# Patient Record
Sex: MALE | Race: WHITE | NOT HISPANIC OR LATINO | URBAN - METROPOLITAN AREA
[De-identification: names, ages, dates, MRNs, and addresses within clinical notes are randomized per-mention and may not be internally consistent; named-entity substitution may affect disease eponyms.]

---

## 2018-02-21 ENCOUNTER — OFFICE VISIT CONVERTED (OUTPATIENT)
Dept: UROLOGY | Facility: CLINIC | Age: 78
End: 2018-02-21
Attending: UROLOGY

## 2018-08-21 ENCOUNTER — OFFICE VISIT CONVERTED (OUTPATIENT)
Dept: SURGERY | Facility: CLINIC | Age: 78
End: 2018-08-21
Attending: PHYSICIAN ASSISTANT

## 2018-08-21 ENCOUNTER — CONVERSION ENCOUNTER (OUTPATIENT)
Dept: SURGERY | Facility: CLINIC | Age: 78
End: 2018-08-21

## 2018-11-02 ENCOUNTER — OFFICE VISIT CONVERTED (OUTPATIENT)
Dept: UROLOGY | Facility: CLINIC | Age: 78
End: 2018-11-02
Attending: UROLOGY

## 2019-02-04 ENCOUNTER — HOSPITAL ENCOUNTER (OUTPATIENT)
Dept: LAB | Facility: HOSPITAL | Age: 79
Discharge: HOME | End: 2019-02-04
Attending: UROLOGY

## 2019-02-04 LAB — PSA SERPL-MCNC: 0.82 NG/ML (ref 0–4)

## 2019-02-06 ENCOUNTER — OFFICE VISIT CONVERTED (OUTPATIENT)
Dept: UROLOGY | Facility: CLINIC | Age: 79
End: 2019-02-06
Attending: UROLOGY

## 2019-03-06 ENCOUNTER — HOSPITAL ENCOUNTER (OUTPATIENT)
Dept: ULTRASOUND IMAGING | Facility: HOSPITAL | Age: 79
Discharge: HOME | End: 2019-03-06
Attending: INTERNAL MEDICINE

## 2019-03-06 LAB
BASOPHILS # BLD AUTO: 0.12 10*3/UL (ref 0–0.2)
BASOPHILS NFR BLD AUTO: 0.9 % (ref 0–3)
CONV ABS IMM GRAN: 0.12 10*3/UL (ref 0–0.2)
CONV IMMATURE GRAN: 0.9 % (ref 0–1.8)
DEPRECATED RDW RBC AUTO: 46.1 FL (ref 35.1–43.9)
EOSINOPHIL # BLD AUTO: 1.14 10*3/UL (ref 0–0.7)
EOSINOPHIL # BLD AUTO: 8.7 % (ref 0–7)
ERYTHROCYTE [DISTWIDTH] IN BLOOD BY AUTOMATED COUNT: 13.4 % (ref 11.6–14.4)
HBA1C MFR BLD: 12.9 G/DL (ref 14–18)
HCT VFR BLD AUTO: 40 % (ref 42–52)
INR PPP: 0.88 (ref 2–3)
LYMPHOCYTES # BLD AUTO: 1.4 10*3/UL (ref 1–5)
MCH RBC QN AUTO: 30.2 PG (ref 27–31)
MCHC RBC AUTO-ENTMCNC: 32.3 G/DL (ref 33–37)
MCV RBC AUTO: 93.7 FL (ref 80–96)
MONOCYTES # BLD AUTO: 0.95 10*3/UL (ref 0.2–1.2)
MONOCYTES NFR BLD AUTO: 7.2 % (ref 3–10)
NEUTROPHILS # BLD AUTO: 9.38 10*3/UL (ref 2–8)
NEUTROPHILS NFR BLD AUTO: 71.6 % (ref 30–85)
NRBC CBCN: 0 % (ref 0–0.7)
PLATELET # BLD AUTO: 196 10*3/UL (ref 130–400)
PMV BLD AUTO: 8.8 FL (ref 9.4–12.4)
PROTHROMBIN TIME: 9.4 S (ref 9.4–12)
RBC # BLD AUTO: 4.27 10*6/UL (ref 4.7–6.1)
VARIANT LYMPHS NFR BLD MANUAL: 10.7 % (ref 20–45)
WBC # BLD AUTO: 13.11 10*3/UL (ref 4.8–10.8)

## 2019-03-12 ENCOUNTER — HOSPITAL ENCOUNTER (OUTPATIENT)
Dept: ONCOLOGY | Facility: HOSPITAL | Age: 79
Discharge: HOME | End: 2019-03-12
Attending: INTERNAL MEDICINE

## 2019-03-12 ENCOUNTER — OFFICE VISIT CONVERTED (OUTPATIENT)
Dept: ONCOLOGY | Facility: HOSPITAL | Age: 79
End: 2019-03-12
Attending: INTERNAL MEDICINE

## 2019-03-19 ENCOUNTER — HOSPITAL ENCOUNTER (OUTPATIENT)
Dept: PET IMAGING | Facility: HOSPITAL | Age: 79
Discharge: HOME | End: 2019-03-19
Attending: INTERNAL MEDICINE

## 2019-03-21 ENCOUNTER — HOSPITAL ENCOUNTER (OUTPATIENT)
Dept: GENERAL RADIOLOGY | Facility: HOSPITAL | Age: 79
Discharge: HOME | End: 2019-03-21
Attending: INTERNAL MEDICINE

## 2019-03-21 LAB
CREAT BLD-MCNC: 2.1 MG/DL (ref 0.6–1.4)
GFR SERPLBLD BASED ON 1.73 SQ M-ARVRAT: 29 ML/MIN/{1.73_M2}

## 2019-03-26 ENCOUNTER — HOSPITAL ENCOUNTER (OUTPATIENT)
Dept: RADIATION ONCOLOGY | Facility: HOSPITAL | Age: 79
Setting detail: RECURRING SERIES
Discharge: HOME | End: 2019-03-31
Attending: RADIOLOGY

## 2019-04-02 ENCOUNTER — HOSPITAL ENCOUNTER (OUTPATIENT)
Dept: ONCOLOGY | Facility: HOSPITAL | Age: 79
Discharge: HOME | End: 2019-04-02
Attending: INTERNAL MEDICINE

## 2019-04-02 ENCOUNTER — OFFICE VISIT CONVERTED (OUTPATIENT)
Dept: ONCOLOGY | Facility: HOSPITAL | Age: 79
End: 2019-04-02
Attending: INTERNAL MEDICINE

## 2021-05-16 VITALS — BODY MASS INDEX: 22 KG/M2 | WEIGHT: 157.12 LBS | RESPIRATION RATE: 14 BRPM | HEIGHT: 71 IN

## 2021-05-16 VITALS — RESPIRATION RATE: 18 BRPM | BODY MASS INDEX: 20.86 KG/M2 | HEIGHT: 71 IN | WEIGHT: 149 LBS

## 2021-05-16 VITALS
DIASTOLIC BLOOD PRESSURE: 80 MMHG | WEIGHT: 170 LBS | HEIGHT: 71 IN | SYSTOLIC BLOOD PRESSURE: 132 MMHG | BODY MASS INDEX: 23.8 KG/M2

## 2021-05-16 VITALS — BODY MASS INDEX: 21.98 KG/M2 | RESPIRATION RATE: 14 BRPM | HEIGHT: 71 IN | WEIGHT: 157 LBS

## 2021-05-28 VITALS
HEIGHT: 70 IN | SYSTOLIC BLOOD PRESSURE: 111 MMHG | BODY MASS INDEX: 19.35 KG/M2 | HEIGHT: 70 IN | BODY MASS INDEX: 20.1 KG/M2 | OXYGEN SATURATION: 99 % | HEART RATE: 111 BPM | DIASTOLIC BLOOD PRESSURE: 88 MMHG | SYSTOLIC BLOOD PRESSURE: 156 MMHG | TEMPERATURE: 98.2 F | WEIGHT: 140.37 LBS | RESPIRATION RATE: 18 BRPM | OXYGEN SATURATION: 97 % | HEART RATE: 103 BPM | WEIGHT: 135.14 LBS | TEMPERATURE: 98.5 F | DIASTOLIC BLOOD PRESSURE: 94 MMHG | RESPIRATION RATE: 16 BRPM

## 2021-05-28 NOTE — PROGRESS NOTES
Patient: NITHIN VIZCAINO     Acct: [id number]     Report: #[id number]  UNIT #: [id number]     : 1940    Encounter Date:2019  PRIMARY CARE: Angel Stone  ***Signed***  --------------------------------------------------------------------------------------------------------------------  NURSE INTAKE      Visit Type      Established Patient Visit            Chief Complaint      LUNG CANCER            Referring Provider/Copies To      Referring Provider:  Que Saini      Primary Care Provider:  ISI STONE      Copies To:   Que Saini ;            History and Present Illness      Past Oncology Illness History      1) NSCLC: favor ADENOCARCINOMA; diagnosed via core biopsy of RIGHT chest wall     lesion 3/6/19; PET and MRI staging c/w Stage IV with extra-thoracic extension;     CARIS: (+) PD-L1: 90%, and MET (+) (3/13/19)            Treatment History:            1) Plan for PET scan/MRI of brain and referral to XRT for consideration for     palliative XRT (3/12/19)      2) PET scan: 1. Hypermetabolic destructive chest wall mass involving the lateral    right 4th and 5th ribs.  Hypermetabolic mediastinal and right hilar adenopathy.     Right pleural metastatic disease. 2. A few bilateral small spiculated pulmonary     nodule suspicious for metastatic disease. (3/19/19)      3) Patient preference for Cabozantinib (19)            HPI - Oncology Interim      Mr. Vizcaino comes in for f/u regarding several chronic issues:            1) Metastatic NSCLC: Mr. Vizcaino comes in for follow-up.  In the interim, he had a    PET scan performed as well as an MRI of the brain and evaluation with radiation     oncology.  He comes in to review the results.  Overall, he reports that he is     worsening.  Specifically, he complains of nausea.  He also reports worsening     pain.  He denies fevers or chills or sweats.  He does report a cough.  The cough    is productive for clear sputum.  It is  intermittent and of mild severity.  No     other modifying factors or associated signs or symptoms.            2) Cancer Pain: Mr. Vizcaino does complain of pain.  The pain is located in the     right chest wall.  It is an achy pain of moderate severity.  It is improved with    tramadol treatment, but he reports that he is needing more treatment to     adequately control his pain.  No other modifying factors or associated signs or     symptoms.  He does report that he was seen by radiation oncology for a     discussion regarding palliative measures.            3) Constipation: Mr. Vizcaino does complain of constipation.  He reports that he is    tried stool softeners and laxatives with some minimal improvement in his     symptoms.  He does complain of associated nausea at this time.            Clinical Trial Participant      No            ECOG Performance Status      1            Most Recent Imaging Findings      Patient: NITHIN VIZCAINO   Acct: #[id number]   Report: #[id number]            UNIT #: [id number]    DOS: 19       INSURANCE:[employer]   ORDER #:[id number]      LOCATION:PET     : 1940            PROVIDERS      ADMITTING:     ATTENDING: Que Saini      FAMILY:  [facility]   ORDERING:  Que Saini         OTHER:    DICTATING:  Rodríguez Robles MD, IV            REQ #:[id number]   EXAM:ISKBSMIDTH - SKULL BASE-MIDTHIGH INIT fdg      REASON FOR EXAM:  LUNG CA      REASON FOR VISIT:  LUNG CA            *******Signed******         PROCEDURE:   PET CT SKULL BASE TO MID THIGH INITIAL             COMPARISON:   [facility], , CHEST PA/AP   12:22.  [facility], , CHEST PA/AP   [facility],       CT, CHEST W/ CONTRAST, 2016, 14:02.  [facility], US, CORE     BIOPSY SOFT TISSUE       MASS, 3/06/2019, 8:32.  [facility], CT, CHEST W/ CONTRAST,     2019, 10:18.             INDICATIONS:   LUNG CA              TECHNIQUE:   After obtaining the patient's consent, F-18 FDG was administered     intravenously.  A PET       scan with concurrent CT imaging was performed from the skull to the thigh with     multiplanar imaging.             RADIONUCLIDE:     12.22 MCI   F18 FDG- I.V.      LABS:                          Blood Glucose 198 mg/dl             FINDINGS:         HEAD/NECK:   Hypermetabolic activity is present in the right lateral pterygoid     muscle probably       physiologic.  1.2 cm hypermetabolic right supraclavicular node.          THORAX:   There is a hypermetabolic destructive chest wall mass involving the     lateral right 4th and       5th ribs.  There is a 2 cm hypermetabolic right axillary node.  Right pleural     metastatic disease is       present.  There is a 1.2 cm hypermetabolic left upper lobe nodule.  There is a 7    mm spiculated       right upper lobe nodule with suspicious morphology.  Hypermetabolic mediastinal     and right hilar       adenopathy is present.  The largest node is in the right hilar region measuring     3.6 cm.  No       evidence of significant pleural effusion.  Calcified right pleural plaques are     present.  7 mm       spiculated nodule along the left major fissure with faint hypermetabolic     activity.        ABDOMEN:   Gallstones are present in the gallbladder.  No evidence of abnormal     hypermetabolic       activity in the solid abdominal organs.  No pathologic FDG activity.        PELVIS:   Normal.  No pathologic FDG activity.        BONES:   No hypermetabolic osseous lesions are identified.  No pathologic FDG     activity.        OTHER:   Negative.               CONCLUSION:                1. Hypermetabolic destructive chest wall mass involving the lateral right 4th     and 5th ribs.        Hypermetabolic mediastinal and right hilar adenopathy.  Right pleural metastatic    disease.             2. A few bilateral small spiculated pulmonary nodule suspicious for  metastatic     disease.            Patient: NITHIN ZHONG   Acct: #[id number]   Report: #[id number]            UNIT #: [id number]    DOS: 19 1200      INSURANCE:[employer] PPO   ORDER #:MRI [id number]      LOCATION:DAISY     : 1940            PROVIDERS      ADMITTING:     ATTENDING: Que Saini      FAMILY:  NONE,MD   ORDERING:  Que Saini         OTHER:  TOBIN RAMIREZ   DICTATING:  Rodríguez Robles MD, IV            REQ #:[id number]   EXAM:BRAWO - MRI BRAIN  wo CONTRAST      REASON FOR EXAM:        REASON FOR VISIT:  C34.90/LUNG CA            *******Signed******         PROCEDURE:   MRI BRAIN WITHOUT CONTRAST             COMPARISON:   [facility], PET, SKULL BASE TO MID THIGH INITIAL,     3/19/2019, 13:45.             INDICATIONS:   RECENT DIAGNOSIS OF LUNG CANCER 3/5/19, RULE OUT METASTATIC     DISEASE. LABS DRAWN TODAY,       CREA. 2.1, GFR=29.             TECHNIQUE:   Multiplanar multisequence images of the brain without intravenous     gadolinium.             FINDINGS:      The ventricles have a normal size and configuration.  There is no evidence of     acute intracranial       hemorrhage, mass or midline shift.  No extra-axial fluid collections are     identified.  No evidence       of acute ischemia on diffusion-weighted images.  Moderate chronic appearing     changes are present in       the periventricular white matter.  There is an area of old infarction in the     posterior medial right       occipital lobe.             IMPRESSION:  No acute intracranial abnormalities are identified.             RODRÍGUEZ ROBLES MD             Electronically Signed and Approved By: RODRÍGUEZ ROBLES MD on 3/21/2019 at 12:58           3. 1.2 cm hypermetabolic right supraclavicular lymph node.             4. Cholelithiasis.              RODRÍGUEZ ROBLES MD             Electronically Signed and Approved By: RODRÍGUEZ ROBLES MD on 3/19/2019 at 15:52            PAST,  FAMILY   Past Medical History      Past Medical History:  Diabetes Type 2      Hematology/Oncology (M):  Lung Cancer, Pancreatic Cancer (HISTORY OF)            Family History      Family History:  Breast Cancer (MOTHER)            Social History      Marital Status:        Occupation:  RETIRED            Tobacco Use      Tobacco status:  Current every day smoker (1 ppd x 60 years, still smokes some     daily )            REVIEW OF SYSTEMS      General:  Admits: Appetite Change;          Denies: Fatigue, Fever, Night Sweats, Weight Gain, Weight Loss      Eye:  Denies: Blurred Vision, Corrective Lenses, Diplopia, Eye Irritation, Eye     Pain, Eye Redness, Spots in Vision, Vision Loss      ENT:  Denies: Headache, Hearing Loss, Hoarseness, Seizures, Sinus Congestion,     Sore Throat      Cardiovascular:  Admits: Chest Pain;          Denies: Edema Ankles, Edema Legs, Irregular Heartbeat, Palpitations      Respiratory:  Admits: Productive Cough;          Denies: Coughing Blood, Shortness of Air, Wheezing      Gastrointestinal:  Denies: Bloody Stools, Constipation, Diarrhea, Frequent     Heartburn, Nausea, Problem Swallowing, Tarry Stools, Unable to Control Bowels,     Vomiting      Genitourinary (male):  Denies: Blood in Urine, Decrease Urine Stream, Frequent     Urination, Incontinence, Painful Urination      Musculoskeletal:  Denies: Back Pain, Leg Cramps, Muscle Pain, Muscle Weakness,     Painful Joints, Swollen Joints      Integumentary:  Denies: Bleeds Easily, Bruises Easily, Hair Changes, Jaundice,     Lesions, Mole Changes, Nail Changes, Pigment Changes, Rash, Skin Discoloration      Neurologic:  Denies: Dizziness, Fainting, Numbness\Tingling, Paralysis, Seizures      Psychiatric:  Denies: Anxiety, Confused, Depression, Disoriented, Memory Loss      Endocrine:  Denies: Cold Intolerance, Diabetes, Excessive Sweating, Excessive T    richard, Excessive Urination, Heat Intolerance, Flushing, Hyperthyroidism,      Hypothyroidism      Hematologic/Lymphatic:  Denies: Bruising, Bleeding, Enlarged Lymph Nodes,     Recurrent Infections, Transfusions            VITAL SIGNS AND SCORES      Vitals      Height 5 ft 10.00 in / 177.8 cm      Weight 135 lbs 2.272 oz / 61.3 kg      BSA 1.77 m2      BMI 19.4 kg/m2      Temperature 98.5 F / 36.94 C - Temporal      Pulse 111      Respirations 16      Blood Pressure 111/94 Sitting, Right Arm      Pulse Oximetry 97%, RM AIR            Pain Score      Experiencing any pain?:  Yes      Pain Scale Used:  Numerical      Pain Intensity:  5            Fatigue Score      Experiencing any fatigue?:  Yes      Fatigue (0-10 scale):  5            EXAM      General Appearance:  Positive for: Alert, Oriented x3      Eye:  Positive for: Anicteric Sclerae, PERRLA      Neck:  Positive for: Supple      Respiratory:  Negative for: Rales, Rhochi      Abdomen/Gastro:  Positive for: Soft;          Negative for: Hepatosplenomegaly      Cardiovascular:  Positive for: RRR;          Negative for: Murmur      Skin:  Negative for: Induration, Lesions      Psychiatric:  Positive for: AAO X 3      Lower Extremities:  Negative for: Edema            PREVENTION      Influenza Vaccine Declined:  Yes      2 or More Falls Past Year?:  No      Fall Past Year with Injury?:  No      Hx Pneumococcal Vaccination:  No      Encouraged to follow-up with:  PCP regarding preventative exams.      Chart initiated by      CORY DAS [facility]            ALLERGY/MEDS      Allergies      Coded Allergies:             *No Known Allergies (Verified  Allergy, Unknown, 4/2/19)            Medications      Last Reconciled on 3/5/19 17:15 by JANINA FISH MD      Prochlorperazine Maleate (Prochlorperazine Maleate) 10 Mg Tab      10 MG PO Q6H PRN for NAUSEA AND/OR VOMITING, #60 TAB 3 Refills         Prov: TOBIN RAMIREZ         3/19/19       (No Home Meds)   No Conflict Check               Reported         3/12/19      Medications Reviewed:  No  Changes made to meds            IMPRESSION/PLAN      Impression      1) Metastatic NSCLC: Mr. Vizcaino is a 78-year-old gentleman with metastatic non-    small cell lung cancer.  I had an extensive discussion today with him regarding     treatment.  In addition, I reviewed with him his PET scan.  This shows extension    outside of the right chest consistent with metastatic disease.  However, his     disease does appear localized to the right chest and supraclavicular area.  My     recommendation would be for consideration for systemic therapy.  He reports that    radiation was considered but he is declined this at this time.  I reviewed with     him his CARIS results which show that he is positive for both PD-L1 as well as     MET abnormality.  I did discuss with him treatment options that would target     both of these abnormalities.  His preference is to proceed with a pill based     therapy if feasible.  I discussed potential risks and side effects including but    not limited to liver abnormalities, diarrhea, myelosuppression, nausea, rash,     and fatigue.  He indicates understanding.  We will attempt to obtain     preauthorization for cabozantinib.  Otherwise, he will consider Keytruda in the     interim as well.  I will see him back in roughly 10 days to review options.            2) Cancer Pain: He will consider palliative radiation therapy.  Otherwise, he     will continue tramadol we will consider adding Percocet to supplement this.            3) Constipation: I did advise him to take both a stool softener as well as     either MiraLAX or milk of magnesia to address his constipation.            Diagnosis      Stage IV adenocarcinoma of lung         Adenocarcinoma of right lung, stage 4 - C34.91         Laterality: right            Cancer related pain - G89.3            Anemia         Anemia due to stage 3 chronic kidney disease - N18.3, D63.1         Anemia type: due to chronic kidney disease         Chronic  kidney disease stage: stage 3 (moderate)            Plan      1) PAR Cabozantinib; consider Keytruda if not approved            2) Continue pain regimen; add Percocet and consider palliative XRT            3) RTC 4/15/19 to potentially start treatment            The total time of the visit was 45 minutes.  Over 50% of time was spent in     face-to-face counseling reviewing different treatment options, reviewing PD-L1     positivity and MET based therapy, and coordination of care.            Patient Education      Patient Education Provided:  Yes                 Disclaimer: Converted document may not contain table formatting or lab diagrams. Please see [employer] for the authenticated document.

## 2021-05-28 NOTE — PROGRESS NOTES
Patient: NITHIN ZHONG     Acct: [id number]     Report: #[id number]  UNIT #: [id number]     : 1940    Encounter Date:2019  PRIMARY CARE: Angel Stone  ***Signed***  --------------------------------------------------------------------------------------------------------------------  Chief Complaint      Encounter Date      Mar 12, 2019            Primary Care Provider      Viviane Williamson            Referring Provider      Viviane Williamson            Patient Complaint      Patient is complaining of      Pt here for f/u, Chest wall mass            VITALS      Height 5 ft 10 in / 177.8 cm      Weight 140 lbs 6 oz / 63.306150 kg      BSA 1.80 m2      BMI 20.1 kg/m2      Temperature 98.2 F / 36.78 C - Temporal      Pulse 103      Respirations 18      Blood Pressure 156/88 Sitting, Right Arm      Pulse Oximetry 99%, Room air            HPI      The patient is a very pleasant 78 year old white male here today for follow up     after having IR guided biopsy. The patient underwent biopsy on 19 of the     chest wall mass that was positive for adenocarcinoma. The patient is complaining    of severe pain that is located over the right chest, constant in nature, getting    worse, severe,  8/10 sometimes 10/10. He has no significant improvement with     Motrin or Tylenol. He has no associated weakness and no significant shortness of    breath at this time. The pain is located over the lateral aspect of the     patient's right chest into the upper part of the abdomen.            ROS      Constitutional:  Denies: Fatigue, Fever, Weight gain, Weight loss, Chills,     Insomnia, Other      Respiratory/Breathing:  Complains of: Pleuritic pain; Denies: Shortness of air,     Wheezing, Cough, Hemoptysis, Other      Endocrine:  Denies: Polydipsia, Polyuria, Heat/cold intolerance, Diabetes, Other      Eyes:  Denies: Blurred vision, Vision Changes, Other      Ears, nose, mouth, throat:  Denies:  Mouth lesions, Thrush, Throat pain,     Hoarseness, Allergies/Hay Fever, Post Nasal Drip, Headaches, Recent Head Injury,    Nose Bleeding, Neck Stiffness, Thyroid Mass, Hearing Loss, Ear Fullness, Dry     Mouth, Nasal or Sinus Pain, Dry Lips, Nasal discharge, Nasal congestion, Other      Cardiovascular:  Denies: Palpitations, Syncope, Claudication, Chest Pain, Wake     up Gasping for air, Leg Swelling, Irregular Heart Rate, Cyanosis, Dyspnea on     Exertion, Other      Gastrointestinal:  Denies: Nausea, Constipation, Diarrhea, Abdominal pain,     Vomiting, Difficulty Swallowing, Reflux/Heartburn, Dysphagia, Jaundice,     Bloating, Melena, Bloody stools, Other      Genitourinary:  Denies: Urinary frequency, Incontinence, Hematuria, Urgency,     Nocturia, Dysuria, Testicular problems, Other      Musculoskeletal:  Denies: Joint Pain, Joint Stiffness, Joint Swelling, Myalgias,    Other      Hematologic/lymphatic:  DENIES: Lymphadenopathy, Bruising, Bleeding tendencies,     Other      Neurological:  Denies: Headache, Numbness, Weakness, Seizures, Other      Psychiatric:  Denies: Anxiety, Appropriate Effect, Depression, Other      Sleep:  No: Excessive daytime sleep, Morning Headache?, Snoring, Insomnia?, Stop    breathing at sleep?, Other      Integumentary:  Denies: Rash, Dry skin, Skin Warm to Touch, Other      Immunologic/Allergic:  Denies: Latex allergy, Seasonal allergies, Asthma,     Urticaria, Eczema, Other      Immunization status:  No: Up to date            FAMILY/SOCIAL/MEDICAL HX      Surgical History:  Yes: Bladder Surgery (PROSTATE REMOVED), Head Surgery (RIGHT     CATARACT)      Cancer/Type - Family Hx:  Mother, Father      Is Father Still Living?:  No      Is Mother Still Living?:  No      Smoking status:  Current every day smoker (1 ppd x 60 years, still smokes some     daily )      Anticoagulation Therapy:  No      Antibiotic Prophylaxis:  No      Medical History:  Yes: Chemotherapy/Cancer (PROSTATE CA),  Deafness or Ringing     Ears (DOES NOT WEAR THEM), Diabetes (TYPE II); No: Blood Disease,     Hemorrhoids/Rectal Prob, Shortness Of Breath, Sinus Trouble      Psychiatric History      none            PREVENTION      Hx Influenza Vaccination:  No      Influenza Vaccine Declined:  Yes      2 or More Falls Past Year?:  No      Fall Past Year with Injury?:  No      Hx Pneumococcal Vaccination:  No      Encouraged to follow-up with:  PCP regarding preventative exams.      Chart initiated by      Viviane Ivy MA            ALLERGIES/MEDICATIONS      Allergies:        Coded Allergies:             *No Known Allergies (Verified  Allergy, Unknown, 3/11/19)      Medications    Last Reconciled on 3/5/19 17:15 by JANINA FISH MD      (No Home Meds)   No Conflict Check               Reported         3/12/19      Current Medications      Current Medications Reviewed 3/11/19            EXAM      GEN-patient appears stated age resting comfortable in no acute distress      Eyes-PERRL,  conjunctiva are normal in appearance extraocular muscles are     intact, no scleral icterus      Nasal-both nares are patent turbinates appear normal no polyps seen no nasal     discharge or ulcerations      Ears-tympanic membranes are normal no erythema no bulging, normal to inspection      Lymphatic-no swollen or enlarged cervical nodes, or axillary node, or femoral     nodes, or supraclavicular nodes      Mouth normal dentition, no erythema no ulcerations oropharynx appears normal no     exudate no evidence of postnasal drip, MP(1)      Neck-there are no palpable supraclavicular or cervical adenopathy, thyroid is     normal in appearance no apparent nodules, there is no inspiratory or expiratory     stridor      Respiratory-patient exhibits normal work of breathing, speaking in full     sentences without difficulty, the chest is normal in appearance, clear to     auscultation with no wheezes rales or rhonchi, chest is normal to percussion on      both the right and left sides      Cardiovascular-the heart rate is normal and regular S1 and S2 present with no     murmur or extra heart sounds, there is no JVD or pedal edema present      GI-the abdomen is normal in appearance, bowel sounds present and normal in all     quadrants no hepatosplenomegaly or masses felt      Extremities-no clubbing is present, pulses present in all extremities, capillary     refill time is normal      Musculoskeletal-Normal strength in upper and lower extremities, inspection shows     no evidence of muscle atrophy      Skin-skin is normal in appearance it is warm and dry, no rashes present, no     evidence of cyanosis, palpation reveals no masses      Neurological-the patient is alert and oriented to time place and person, moves     all 4 extremities, normal gait, normal affect and mood, CN2-12 intact      Psych-normal judgment and insight is good, normal mood and affect, alert and     oriented to person, place, and time, and date      Vtials      Vitals:             Height 5 ft 10 in / 177.8 cm           Weight 140 lbs 6 oz / 63.943377 kg           BSA 1.80 m2           BMI 20.1 kg/m2           Temperature 98.2 F / 36.78 C - Temporal           Pulse 103           Respirations 18           Blood Pressure 156/88 Sitting, Right Arm           Pulse Oximetry 99%, Room air            REVIEW      Results Reviewed      PCCS Results Reviewed?:  Yes Prev Lab Results, Yes Prev Radiology Results, Yes     [facility]            Assessment      Lung cancer - C34.90            Notes      New Diagnostics      * PET [facility] IN [facility]SP, SCHEDULED PROCEDURE         Dx: Lung cancer - C34.90      * Brain W/WO Cont MRI, SCHEDULED PROCEDURE         Dx: Lung cancer - C34.90      New Referrals      * Radiation Therapy, SCHEDULED PROCEDURE         Rigo Dean         Dx: Lung cancer - C34.90      ASSESSMENT:      1. Non small cell lung cancer stage undetermined at this time.        2. Severe pain secondary to chest wall invasion.             PLAN:       1. We will make referral to radiation oncology to radiate this area to see if it     could help with local regional pain control.       2. Obtain PET scan.       3. Obtain MRI of the brain with and without contrast for further staging.       4. Oncology to see today.       5. Pain medications per oncology for pain control.       6. I have personally reviewed laboratory data, imaging as well as previous     medical records.            Patient Education      Education resources provided:  Yes      Patient Education Provided:  Lung Cancer                 Disclaimer: Converted document may not contain table formatting or lab diagrams. Please see [employer] for the authenticated document.

## 2021-05-28 NOTE — PROGRESS NOTES
Patient: NITHIN VIZCAINO     Acct: [id number]     Report: #[id number]  UNIT #: [id number]     : 1940    Encounter Date:2019  PRIMARY CARE: Angel Stone  ***Signed***  --------------------------------------------------------------------------------------------------------------------  NURSE INTAKE      Visit Type      New Patient Visit            Chief Complaint      LUNG CANCER            Referring Provider/Copies To      Referring Provider:  Que Saini      Primary Care Provider:  ISI STONE      Copies To:   Que Saini ;            History and Present Illness      Past Oncology Illness History      1) NSCLC: favor ADENOCARCINOMA; diagnosed via core biopsy of R chest wall lesion    3/6/19; PET and MRI to complete staging pending            Treatment History:            1) Plan for PET scan/MRI of brain and referral to XRT for consideration for     palliative XRT (3/12/19)            HPI - Oncology Interim      Mr. Vizcaino is a 78-year-old gentleman with a past medical history significant for    diabetes who is seen regarding a new diagnosis of non-small cell lung cancer,     felt to be most consistent with adenocarcinoma subtype.  He is seen for     recommendations regarding further evaluation and management.            Mr. Vizcaino was in his usual state of health when he was noted to have progressive    dyspnea and fatigue as well as weight loss.  He underwent a CT scan of the chest    on 2019.  This identified a right chest wall lesion with associated    right axillary, mediastinal, and precarinal lymph nodes.  There is also noted to    be a pleural-based mass with rib destruction at ribs 4, 5, and possibly 6.      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  As a consequence of these findings, he was referred to      pulmonology and a biopsy of the right chest wall lesion was performed on March 6, 2019.  The biopsy was notable for a finding of non-small cell lung cancer,     favoring adenocarcinoma.  The primary source was felt to be not inconsistent     with a lung primary, but the initial workup was not definitive for this.  As a     consequence of this, he is now referred to medical oncology for further     evaluation and management.            Mr. Vizcaino reports that he is recovering from his biopsy.  He does report ongoing    pain located in the right chest wall.  It radiates caudally.  It is of moderate     severity.  He is requesting pain medication for it.  No other modifying factors     or associated signs or symptoms.  He otherwise denies headaches or visual     changes.  He denies nausea or vomiting.  He denies right upper quadrant pain.      He denies pleurisy or hemoptysis.  He does report a history of tobacco abuse.            Most Recent Lab Findings      Laboratory Tests      2/28/19 09:39            3/6/19 09:12            PAST, FAMILY   Past Medical History      Past Medical History:  Diabetes Type 2      Hematology/Oncology (M):  Lung Cancer, Pancreatic Cancer (HISTORY OF)            Past Surgical History      PROSTATE SURGERY            Family History      Family History:  Breast Cancer (MOTHER)      FATHER HAD UNKNOWN CANCER,NIECE HAS CANCER            Social History      Marital Status:        Occupation:  RETIRED            Tobacco Use      Tobacco status:  Current every day smoker (1 ppd x 60 years, still smokes some     daily )            REVIEW OF SYSTEMS      General:  Admits: Fatigue;          Denies: Appetite Change, Fever, Night Sweats, Weight Gain, Weight Loss      ENT:  Denies: Headache, Hearing Loss, Hoarseness, Seizures, Sinus Congestion,     Sore Throat      Cardiovascular:  Admits: Chest Pain;          Denies: Edema Ankles, Edema Legs, Irregular Heartbeat, Palpitations       Respiratory:  Denies: Coughing Blood, Productive Cough, Shortness of Air, Whe    ezing      Gastrointestinal:  Denies: Bloody Stools, Constipation, Diarrhea, Frequent     Heartburn, Nausea, Problem Swallowing, Tarry Stools, Unable to Control Bowels,     Vomiting      Other      STOMACH PAINS      Genitourinary (male):  Denies: Blood in Urine, Decrease Urine Stream, Frequent     Urination, Incontinence, Painful Urination      Musculoskeletal:  Denies: Back Pain, Leg Cramps, Muscle Pain, Muscle Weakness,     Painful Joints, Swollen Joints      Integumentary:  Denies: Bleeds Easily, Bruises Easily, Hair Changes, Jaundice,     Lesions, Mole Changes, Nail Changes, Pigment Changes, Rash, Skin Discoloration      Neurologic:  Denies: Dizziness, Fainting, Numbness\Tingling, Paralysis, Seizures      Psychiatric:  Denies: Anxiety, Confused, Depression, Disoriented, Memory Loss      Endocrine:  Denies: Cold Intolerance, Diabetes, Excessive Sweating, Excessive     Thirst, Excessive Urination, Heat Intolerance, Flushing, Hyperthyroidism,     Hypothyroidism      Hematologic/Lymphatic:  Denies: Bruising, Bleeding, Enlarged Lymph Nodes,     Recurrent Infections, Transfusions            VITAL SIGNS AND SCORES      Vitals      Height 5 ft 10.00 in / 177.8 cm      Weight 140 lbs 6.928 oz / 63.7 kg      BSA 1.80 m2      BMI 20.2 kg/m2      Temperature 98.2 F / 36.78 C - Temporal      Pulse 103      Respirations 18      Blood Pressure 156/88 Sitting, Right Arm      Pulse Oximetry 99%, RM AIR            Pain Score      Experiencing any pain?:  Yes      Pain Scale Used:  Numerical      Pain Intensity:  6            Fatigue Score      Experiencing any fatigue?:  Yes      Fatigue (0-10 scale):  5            EXAM      General Appearance:  Positive for: Alert, Oriented x3      Eye:  Positive for: PERRLA      HEENT:  Negative for: Scleral Icterus      Neck:  Positive for: Supple      Respiratory:  Negative for: Rales, Rhochi       Abdomen/Gastro:  Positive for: Soft;          Negative for: Hepatosplenomegaly      Cardiovascular:  Positive for: RRR;          Negative for: Murmur, Rub      Skin:  Negative for: Induration, Lesions      Psychiatric:  Positive for: AAO X 3      Lower Extremities:  Negative for: Edema            PREVENTION      Influenza Vaccine Declined:  Yes      2 or More Falls Past Year?:  No      Fall Past Year with Injury?:  No      Hx Pneumococcal Vaccination:  No      Encouraged to follow-up with:  PCP regarding preventative exams.      Chart initiated by      CORY DAS CMA            ALLERGY/MEDS      Allergies      Coded Allergies:             *No Known Allergies (Verified  Allergy, Unknown, 3/11/19)            Medications      Last Reconciled on 3/5/19 17:15 by JANINA FISH MD      (No Home Meds)   No Conflict Check               Reported         3/12/19      Medications Reviewed:  No Changes made to meds            IMPRESSION/PLAN      Impression      1) NSCLC: Mr. Vizcaino is a 78-year-old gentleman with a primary lung mass with     extension to the chest wall felt to be most consistent with a non-small cell     lung cancer, favoring adenocarcinoma subtype.  I had an extensive discussion     today with him regarding this.  We will proceed with further staging studies     such as MRI of the brain as well as PET scan.  Given that his pathologic     diagnosis is not definitive at this time, we will consider further biopsy if     necessary based on the PET scan result.  Otherwise, I did discuss with him     treatment.  Currently, treatment would be palliative in nature.  I would     recommend referral to radiation oncology for consideration for palliative     radiation therapy.  Otherwise, given his comorbidities and significant findings     on his PET scan, I would recommend consideration for further testing to identify    targetable mutations.  If his testing is otherwise negative, I would consider     palliative  measures at that time.  Alternatively, low-dose single agent     chemotherapy could be considered.  However, I did explain to him that there     would be significant risk with any systemic chemotherapy treatments given his     comorbidities.  Mr. Vizcaino will consider these options in the interim.      Otherwise, I will see him back in roughly 3 weeks to review genetic testing     results as well as to assess for improvement following radiation treatment for     his right chest wall abnormality.  All of Mr. Vizcaino and his family's questions     were answered to their satisfaction.  They agreed with the plan as outlined     above.            Diagnosis      Adenocarcinoma of lung         Adenocarcinoma of right lung - C34.91         Laterality: right            Bone metastases - C79.51            Leukocytosis         Leukocytosis, unspecified type - D72.829         Leukocytosis type: unspecified            Notes      New Medications      * (NO HOME MEDS):             Plan      1) PET scan            2) MRI of brain            3) Referral to XRT for consideration for palliative treatment to R chest wall            4) Foundation testing            5) RTC 3 weeks to review testing results and discuss treatment; cbc/cmp prior            The total time of the visit was 60 minutes.  Over 50% of that time was spent in     face-to-face counseling reviewing previous study results, reviewing pathology     results, treatment options, risks and benefits associated with treatment, and     coordination of care.            Patient Education      Patient Education Provided:  Yes                 Disclaimer: Converted document may not contain table formatting or lab diagrams. Please see [employer] [facility] for the authenticated document.